# Patient Record
(demographics unavailable — no encounter records)

---

## 2024-11-13 NOTE — HISTORY OF PRESENT ILLNESS
[FreeTextEntry1] : Patient in office for annual exam. patient states menses is irregular. patient has had occasional irregular bleeding